# Patient Record
(demographics unavailable — no encounter records)

---

## 2025-07-06 NOTE — HISTORY OF PRESENT ILLNESS
[FreeTextEntry1] : 49-year-old male with history of coronary artery disease, CVA, hypertension hyperlipidemia, diabetes who was recently admitted at [facility] for epigastric pain and had a Zambrano catheter placed.  Presents today for follow-up